# Patient Record
Sex: FEMALE | Race: WHITE | NOT HISPANIC OR LATINO | URBAN - METROPOLITAN AREA
[De-identification: names, ages, dates, MRNs, and addresses within clinical notes are randomized per-mention and may not be internally consistent; named-entity substitution may affect disease eponyms.]

---

## 2019-04-05 PROBLEM — Z01.419 WELL WOMAN EXAM: Status: ACTIVE | Noted: 2019-04-05

## 2019-04-11 ENCOUNTER — OFFICE VISIT (OUTPATIENT)
Dept: OBSTETRICS AND GYNECOLOGY | Facility: CLINIC | Age: 49
End: 2019-04-11

## 2019-04-11 VITALS — SYSTOLIC BLOOD PRESSURE: 126 MMHG | DIASTOLIC BLOOD PRESSURE: 72 MMHG | WEIGHT: 240 LBS | RESPIRATION RATE: 14 BRPM

## 2019-04-11 DIAGNOSIS — Z01.419 WELL WOMAN EXAM: Primary | ICD-10-CM

## 2019-04-11 DIAGNOSIS — Z12.11 SCREEN FOR COLON CANCER: ICD-10-CM

## 2019-04-11 PROBLEM — J45.909 ASTHMA: Status: ACTIVE | Noted: 2019-01-01

## 2019-04-11 PROBLEM — E78.00 HIGH CHOLESTEROL: Status: ACTIVE | Noted: 2018-01-01

## 2019-04-11 PROBLEM — E11.9 DIABETES TYPE 2, CONTROLLED (HCC): Status: ACTIVE | Noted: 2018-01-01

## 2019-04-11 PROCEDURE — 99386 PREV VISIT NEW AGE 40-64: CPT | Performed by: OBSTETRICS & GYNECOLOGY

## 2019-04-11 RX ORDER — GLIPIZIDE 5 MG/1
TABLET, FILM COATED, EXTENDED RELEASE ORAL
COMMUNITY
Start: 2019-02-09

## 2019-04-11 RX ORDER — NYSTATIN AND TRIAMCINOLONE ACETONIDE 100000; 1 [USP'U]/G; MG/G
OINTMENT TOPICAL 2 TIMES DAILY
Qty: 60 G | Refills: 2 | Status: SHIPPED | OUTPATIENT
Start: 2019-04-11 | End: 2020-11-12 | Stop reason: SDUPTHER

## 2019-04-11 RX ORDER — ERGOCALCIFEROL 1.25 MG/1
50000 CAPSULE ORAL WEEKLY
COMMUNITY

## 2019-04-11 RX ORDER — METFORMIN HYDROCHLORIDE 500 MG/1
TABLET, EXTENDED RELEASE ORAL
Refills: 0 | COMMUNITY
Start: 2019-03-18

## 2020-11-06 ENCOUNTER — TELEPHONE (OUTPATIENT)
Dept: OBSTETRICS AND GYNECOLOGY | Facility: CLINIC | Age: 50
End: 2020-11-06

## 2020-11-06 NOTE — TELEPHONE ENCOUNTER
DR. ELIZABETH PT. SHE SAID SHE HAS A YEAST INFECTION AND WOULD LIKE TO GET MEDICATION CALLED IN TODAY. HER [facility] IS [facility].      THANK YOU

## 2020-11-11 NOTE — TELEPHONE ENCOUNTER
She had similar complaints 4/2019.  Did the medicines I gave her back then help?  Has it been ongoing since then?

## 2020-11-11 NOTE — TELEPHONE ENCOUNTER
Vag burning and itching. No discharge.  Itching is more on the outside.  Has used nystatin-triamcinolone ointment and terazol.

## 2020-11-12 RX ORDER — NYSTATIN AND TRIAMCINOLONE ACETONIDE 100000; 1 [USP'U]/G; MG/G
OINTMENT TOPICAL 2 TIMES DAILY
Qty: 60 G | Refills: 2 | Status: SHIPPED | OUTPATIENT
Start: 2020-11-12

## 2020-11-12 NOTE — TELEPHONE ENCOUNTER
Done    New Medications Ordered This Visit   Medications   • nystatin-triamcinolone (MYCOLOG) 384835-0.1 UNIT/GM-% ointment     Sig: Apply  topically to the appropriate area as directed 2 (Two) Times a Day.     Dispense:  60 g     Refill:  2   • terconazole (Terazol 7) 0.4 % vaginal cream     Sig: Insert 1 applicator into the vagina Every Night for 7 days.     Dispense:  45 g     Refill:  0

## 2020-11-12 NOTE — TELEPHONE ENCOUNTER
Pt states that meds you gave her last time worked great, pt just didn't have any refills left of the meds, Pt is aware  is out of office this week

## 2021-07-27 NOTE — PROGRESS NOTES
Subjective   Chief Complaint   Patient presents with   • Establish Care     Antoinette Keating is a 49 y.o. year old  presenting to be seen for her annual exam.  Antoinette is a former patient.  It has been several years since she has been in.  She is just trying to get all of her medical appointments back in line.  She has been very busy but now she is trying to get all of her tests in order.  She has been postmenopausal for at least 8 years time now.  Having no major symptoms related to menopause.    She has never had a mammogram.  Just because things can get busy.  She is having no breast complaints but would like to get mammography set up    SEXUAL Hx:  She is not currently sexually active.  In the past year there she has not been sexually active.    Condoms are not needed because she is not sexually active.  She would not like to be screened for STD's at today's exam.  HEALTH Hx:  She exercises regularly: yes.  She wears her seat belt: yes.  She has concerns about domestic violence: no.  OTHER THINGS SHE WANTS TO DISCUSS TODAY:  Nothing else    The following portions of the patient's history were reviewed and updated as appropriate:problem list, current medications, allergies, past family history, past medical history, past social history and past surgical history.    Social History    Tobacco Use      Smoking status: Never Smoker      Smokeless tobacco: Never Used    Review of Systems  Constitutional POS: nothing reported    NEG: anorexia or night sweats   Genitourinary POS: nothing reported    NEG: dysuria or hematuria      Gastointestinal POS: nothing reported    NEG: bloating, change in bowel habits, melena or reflux symptoms   Integument POS: nothing reported    NEG: moles that are changing in size, shape, color or rashes   Breast POS: nothing reported    NEG: persistent breast lump, skin dimpling or nipple discharge        Objective   /72   Resp 14   Wt 109 kg (240 lb)   Breastfeeding? No     General:   well developed; well nourished  no acute distress   Skin:  No suspicious lesions seen   Thyroid: normal to inspection and palpation   Breasts:  Examined in supine position  Symmetric without masses or skin dimpling  Nipples normal without inversion, lesions or discharge  There are no palpable axillary nodes   Abdomen: soft, non-tender; no masses  no umbilical or inguinal hernias are present  no hepato-splenomegaly   Pelvis: Clinical staff was present for exam  External genitalia:  normal appearance of the external genitalia including Bartholin's and [place]'s glands. There is alin-vestibular erythema as well as thickening of the skin focus predominantly on the medial labia minora bilaterally  :  urethral meatus normal;  Vaginal:  normal pink mucosa without prolapse or lesions. discharge present -  white and thick;  Cervix:  normal appearance.  Uterus:  normal size, shape and consistency.  Adnexa:  non palpable bilaterally.  Rectal:  digital rectal exam not performed; anus visually normal appearing.        Assessment   1. Normal GYN exam in menopause  2. Menopausal female currently not on HRT - without significant symptoms affecting activities of daily living  3. Vulvar pruritus with changes consistent with a mixed dermatitis along with candidiasis  4. Prior history of colon polyps with family history of colorectal cancer     Plan   1. Pap was done today.  If she does not receive the results of the Pap within 2 weeks  time, she was instructed to call to find out the results.  I explained to Antoinette that the recommendations for Pap smear interval in a low risk patient's has lengthened to 3 years time.  I encouraged her to be seen yearly for a full physical exam including breast and pelvic exam even during the off years when PAP's will not be performed.  2. She was encouraged to get yearly mammograms.  She should report any palpable breast lump(s) or skin changes regardless of mammographic findings.  I explained to Antoinette  that notification regarding her mammogram results will come from the center performing the study.  Our office will not be routinely calling with mammogram results.  It is her responsibility to make sure that the results from the mammogram are communicated to her by the breast center.  If she has any questions about the results, she is welcome to call our office anytime. The phone number to schedule a mammogram will be given to Antoinette today at the time of check out.  I explained that she should be able to call the center directly to schedule her screening mammogram without a physician's order.  So long as she gives them our name, a copy of the mammogram report should be sent to us for review.  3. Colonoscopy was recommended for screening for colon cancer.  The procedure was briefly discussed and its benefits for early detection of colon cancer were emphasized.  I explained to Antoinette that we could help her to schedule it if she wishes.  Additionally, she could also contact her primary care physician to help make this arrangement.  Alternatively, she could consider Cologuard (which would need to be done every 3 years).  If Cologuard was abnormal, colonoscopy would be required in follow-up.  After considering these options she wants help setting up her colonoscopy.  Referral will be made to Dr. Patton for outpatient colonoscopy.  4. I explained to Antoinette that vulvar itching often is due to a contact dermatitis.  The source of this often results from products that directly contact the vulvar skin or residues from cleaning products used to launder clothing.  Any product that has a fragrance or oil the directly contacts the skin or comes in contact with the skin through clothing can be the inciting factor.  I encouraged her to examine all products used for cleansing and to move to hypoallergenic, unscented products.  If this fails to control her symptoms, additional testing for things such as food allergies may be warranted.  5. The  importance of keeping all planned follow-up and taking all medications as prescribed was emphasized.  6. Follow up for recheck of itching 2-3 weeks    New Medications Ordered This Visit   Medications   • terconazole (TERAZOL 7) 0.4 % vaginal cream     Sig: Insert 1 applicator into the vagina Every Night for 7 days.     Dispense:  45 g     Refill:  0   • nystatin-triamcinolone (MYCOLOG) 132359-8.1 UNIT/GM-% ointment     Sig: Apply  topically to the appropriate area as directed 2 (Two) Times a Day.     Dispense:  60 g     Refill:  2          This note was electronically signed.    Rivera Verma M.D.  April 11, 2019    Note: Speech recognition transcription software may have been used to create portions of this document.  An attempt at proofreading has been made but errors in transcription could still be present.   Never smoker

## 2024-12-27 ENCOUNTER — OFFICE VISIT (OUTPATIENT)
Dept: OBSTETRICS AND GYNECOLOGY | Facility: CLINIC | Age: 54
End: 2024-12-27
Payer: COMMERCIAL

## 2024-12-27 VITALS — DIASTOLIC BLOOD PRESSURE: 82 MMHG | RESPIRATION RATE: 14 BRPM | SYSTOLIC BLOOD PRESSURE: 128 MMHG | WEIGHT: 213 LBS

## 2024-12-27 DIAGNOSIS — Z01.419 WELL WOMAN EXAM: Primary | ICD-10-CM

## 2024-12-27 DIAGNOSIS — Z71.85 VACCINE COUNSELING: ICD-10-CM

## 2024-12-27 PROBLEM — J45.909 ASTHMA: Status: RESOLVED | Noted: 2019-01-01 | Resolved: 2024-12-27

## 2024-12-27 PROCEDURE — 99386 PREV VISIT NEW AGE 40-64: CPT | Performed by: OBSTETRICS & GYNECOLOGY

## 2024-12-27 RX ORDER — SEMAGLUTIDE 0.68 MG/ML
INJECTION, SOLUTION SUBCUTANEOUS
COMMUNITY
Start: 2024-11-21

## 2024-12-27 RX ORDER — DOXYCYCLINE 100 MG/1
CAPSULE ORAL EVERY 12 HOURS SCHEDULED
COMMUNITY

## 2024-12-27 RX ORDER — ATORVASTATIN CALCIUM 10 MG/1
TABLET, FILM COATED ORAL
COMMUNITY

## 2024-12-27 RX ORDER — MELOXICAM 15 MG/1
TABLET ORAL DAILY
COMMUNITY
Start: 2024-09-16

## 2024-12-27 RX ORDER — INSULIN DEGLUDEC 100 U/ML
INJECTION, SOLUTION SUBCUTANEOUS
COMMUNITY

## 2024-12-27 NOTE — LETTER
2024     Merline Menjivar MD  [address] [place] [id number]    Patient: Antoinette Keating   YOB: 1970   Date of Visit: 2024       Dear Merline Menjivar MD    Antoinette Keating was in my office today. Below is a copy of my note.    If you have questions, please do not hesitate to call me. I look forward to following Antoinette along with you.         Sincerely,        Rivera Verma MD        CC: No Recipients    Subjective  Chief Complaint   Patient presents with   • Gynecologic Exam     Antoinette Keating is a 54 y.o. year old  menopausal female presenting to be seen for her annual exam.  She is a former patient comes to reestablish care.  She has no real gynecologic complaints.  She has been getting mammograms yearly but those do not in our records.  She has never had a colonoscopy but primary care is in the process to get that arranged.    This past year she has not been on hormone replacement therapy.  She has not had any vaginal bleeding in the last 12 months.  Menopausal symptoms are not present.    SEXUAL Hx:  She is currently sexually active.  In the past year there there has been NO new sexual partners.    Condoms are never used.  She would not like to be screened for STD's at today's exam.  [place] is painful: no  HEALTH Hx:  She exercises regularly: yes.  She wears her seat belt: yes.  She has concerns about domestic violence: no.  She has noticed changes in height: no.    The following portions of the patient's history were reviewed and updated as appropriate:problem list, current medications, allergies, past family history, past medical history, past social history, and past surgical history.    Social History    Tobacco Use      Smoking status: Never      Smokeless tobacco: Never      Review of Systems  Constitutional POS: nothing reported    NEG: anorexia or night sweats   Genitourinary POS: nothing reported    NEG: dysuria or hematuria       Gastointestinal POS: nothing reported    NEG: bloating, change in bowel habits, melena, or reflux symptoms   Integument POS: nothing reported and she does not routinely see a dermatologist for screening skin exams    NEG: moles that are changing in size, shape, color or rashes   Breast POS: nothing reported and had normal mammogram in the past 2 years - results are not in record for review    NEG: persistent breast lump, skin dimpling, or nipple discharge        Objective  /82   Resp 14   Wt 96.6 kg (213 lb)     General:  well developed; well nourished  no acute distress  mentation appropriate   Skin:  No suspicious lesions seen   Thyroid: normal to inspection and palpation   Breasts:  Examined in supine position  Symmetric without masses or skin dimpling  Nipples normal without inversion, lesions or discharge  There are no palpable axillary nodes   Abdomen: soft, non-tender; no masses  no umbilical or inguinal hernias are present  no hepato-splenomegaly   Pelvis: Clinical staff was present for exam  External genitalia:  normal appearance of the external genitalia including Bartholin's and [place]'s glands.  :  urethral meatus normal;  Vaginal:  normal pink mucosa without prolapse or lesions.  Cervix:  normal appearance.  Uterus:  normal size, shape and consistency.  Adnexa:  normal bimanual exam of the adnexa.  Rectal:  digital rectal exam not performed; anus visually normal appearing.        Assessment  Normal GYN exam in [facility]  Menopausal female currently not on HRT - without significant symptoms affecting activities of daily living  She is up to date on all relevant gynecologic and colorectal screenings except PAP test and colon cancer screening       Plan  Pap was done today.  If she does not receive the results of the Pap within 2 weeks  time, she was instructed to call to find out the results.  I explained to Antoinette that the recommendations for Pap smear interval in a low risk patient's has lengthened to  3 years time.  I encouraged her to be seen yearly for a full physical exam including breast and pelvic exam even during the off years when PAP's will not be performed.  She was encouraged to get yearly mammograms.  She should report any palpable breast lump(s) or skin changes regardless of mammographic findings.  I explained to Antoinette that notification regarding her mammogram results will come from the center performing the study.  Our office will not be routinely calling with mammogram results.  It is her responsibility to make sure that the results from the mammogram are communicated to her by the breast center.  If she has any questions about the results, she is welcome to call our office anytime.  I have counseled the patient on the importance of staying up to date with preventive vaccines as well as the risks and benefits of these vaccines.  Her vaccine record was reviewed and updated.  I discussed with Antoinette that she may be behind on needed vaccinations for Influenza.  She may be able to obtain these vaccinations at her local pharmacy OR speak about obtaining them with her primary care.  If she does obtain her vaccines, I have asked Antoinette to let us know the date each vaccine was obtained so that her medical record could be updated in our system.  The following data needs to be obtained to update her medical records: last mammogram.  The importance of keeping all planned follow-up and taking all medications as prescribed was emphasized.  Follow up for annual exam 1 year           This note was electronically signed.    Rivera Verma M.D.  December 27, 2024    Part of this note may be an electronic transcription/translation of spoken language to printed text using the Dragon Dictation System.

## 2024-12-27 NOTE — PROGRESS NOTES
Subjective   Chief Complaint   Patient presents with    Gynecologic Exam     Antoinette Keating is a 54 y.o. year old  menopausal female presenting to be seen for her annual exam.  She is a former patient comes to reestablish care.  She has no real gynecologic complaints.  She has been getting mammograms yearly but those do not in our records.  She has never had a colonoscopy but primary care is in the process to get that arranged.    This past year she has not been on hormone replacement therapy.  She has not had any vaginal bleeding in the last 12 months.  Menopausal symptoms are not present.    SEXUAL Hx:  She is currently sexually active.  In the past year there there has been NO new sexual partners.    Condoms are never used.  She would not like to be screened for STD's at today's exam.  [place] is painful: no  HEALTH Hx:  She exercises regularly: yes.  She wears her seat belt: yes.  She has concerns about domestic violence: no.  She has noticed changes in height: no.    The following portions of the patient's history were reviewed and updated as appropriate:problem list, current medications, allergies, past family history, past medical history, past social history, and past surgical history.    Social History    Tobacco Use      Smoking status: Never      Smokeless tobacco: Never      Review of Systems  Constitutional POS: nothing reported    NEG: anorexia or night sweats   Genitourinary POS: nothing reported    NEG: dysuria or hematuria      Gastointestinal POS: nothing reported    NEG: bloating, change in bowel habits, melena, or reflux symptoms   Integument POS: nothing reported and she does not routinely see a dermatologist for screening skin exams    NEG: moles that are changing in size, shape, color or rashes   Breast POS: nothing reported and had normal mammogram in the past 2 years - results are not in record for review    NEG: persistent breast lump, skin dimpling, or nipple discharge         Objective   /82   Resp 14   Wt 96.6 kg (213 lb)     General:  well developed; well nourished  no acute distress  mentation appropriate   Skin:  No suspicious lesions seen   Thyroid: normal to inspection and palpation   Breasts:  Examined in supine position  Symmetric without masses or skin dimpling  Nipples normal without inversion, lesions or discharge  There are no palpable axillary nodes   Abdomen: soft, non-tender; no masses  no umbilical or inguinal hernias are present  no hepato-splenomegaly   Pelvis: Clinical staff was present for exam  External genitalia:  normal appearance of the external genitalia including Bartholin's and [place]'s glands.  :  urethral meatus normal;  Vaginal:  normal pink mucosa without prolapse or lesions.  Cervix:  normal appearance.  Uterus:  normal size, shape and consistency.  Adnexa:  normal bimanual exam of the adnexa.  Rectal:  digital rectal exam not performed; anus visually normal appearing.        Assessment   Normal GYN exam in [facility]  Menopausal female currently not on HRT - without significant symptoms affecting activities of daily living  She is up to date on all relevant gynecologic and colorectal screenings except PAP test and colon cancer screening       Plan   Pap was done today.  If she does not receive the results of the Pap within 2 weeks  time, she was instructed to call to find out the results.  I explained to Antoinette that the recommendations for Pap smear interval in a low risk patient's has lengthened to 3 years time.  I encouraged her to be seen yearly for a full physical exam including breast and pelvic exam even during the off years when PAP's will not be performed.  She was encouraged to get yearly mammograms.  She should report any palpable breast lump(s) or skin changes regardless of mammographic findings.  I explained to Antoinette that notification regarding her mammogram results will come from the center performing the study.  Our office will not be  routinely calling with mammogram results.  It is her responsibility to make sure that the results from the mammogram are communicated to her by the breast center.  If she has any questions about the results, she is welcome to call our office anytime.  I have counseled the patient on the importance of staying up to date with preventive vaccines as well as the risks and benefits of these vaccines.  Her vaccine record was reviewed and updated.  I discussed with Antoinette that she may be behind on needed vaccinations for Influenza.  She may be able to obtain these vaccinations at her local pharmacy OR speak about obtaining them with her primary care.  If she does obtain her vaccines, I have asked Antoinette to let us know the date each vaccine was obtained so that her medical record could be updated in our system.  The following data needs to be obtained to update her medical records: last mammogram.  The importance of keeping all planned follow-up and taking all medications as prescribed was emphasized.  Follow up for annual exam 1 year           This note was electronically signed.    Rivera Verma M.D.  December 27, 2024    Part of this note may be an electronic transcription/translation of spoken language to printed text using the Dragon Dictation System.

## 2024-12-31 LAB — REF LAB TEST METHOD: NORMAL
